# Patient Record
Sex: MALE | Race: ASIAN | Employment: OTHER | URBAN - METROPOLITAN AREA
[De-identification: names, ages, dates, MRNs, and addresses within clinical notes are randomized per-mention and may not be internally consistent; named-entity substitution may affect disease eponyms.]

---

## 2017-05-31 PROBLEM — Z87.891 HISTORY OF TOBACCO ABUSE: Status: ACTIVE | Noted: 2017-05-31

## 2017-06-06 ENCOUNTER — HOSPITAL ENCOUNTER (OUTPATIENT)
Dept: GENERAL RADIOLOGY | Age: 67
Discharge: HOME | End: 2017-06-06
Attending: FAMILY MEDICINE
Payer: MEDICARE

## 2017-06-06 DIAGNOSIS — M25.512 CHRONIC LEFT SHOULDER PAIN: ICD-10-CM

## 2017-06-06 DIAGNOSIS — R05.9 COUGH: ICD-10-CM

## 2017-06-06 DIAGNOSIS — G89.29 CHRONIC LEFT SHOULDER PAIN: ICD-10-CM

## 2017-06-06 PROCEDURE — 71020 XR CHEST PA LAT: CPT

## 2017-06-06 PROCEDURE — 73030 X-RAY EXAM OF SHOULDER: CPT

## 2017-06-11 PROBLEM — I51.7 MILD CARDIOMEGALY: Status: ACTIVE | Noted: 2017-06-11

## 2017-08-17 PROBLEM — Z12.11 SPECIAL SCREENING FOR MALIGNANT NEOPLASMS, COLON: Status: ACTIVE | Noted: 2017-08-17

## 2019-02-14 PROBLEM — N40.2 PROSTATE NODULE: Status: ACTIVE | Noted: 2019-02-14

## 2019-09-25 PROBLEM — Z12.11 SPECIAL SCREENING FOR MALIGNANT NEOPLASMS, COLON: Status: RESOLVED | Noted: 2017-08-17 | Resolved: 2019-09-25

## 2020-04-09 PROBLEM — K21.00 GERD WITH ESOPHAGITIS: Status: ACTIVE | Noted: 2020-04-09

## 2020-09-21 PROBLEM — R19.5 POSITIVE COLORECTAL CANCER SCREENING USING COLOGUARD TEST: Status: ACTIVE | Noted: 2020-09-21

## 2022-03-18 PROBLEM — K21.00 GERD WITH ESOPHAGITIS: Status: ACTIVE | Noted: 2020-04-09

## 2022-03-19 PROBLEM — N40.2 PROSTATE NODULE: Status: ACTIVE | Noted: 2019-02-14

## 2022-03-19 PROBLEM — I51.7 MILD CARDIOMEGALY: Status: ACTIVE | Noted: 2017-06-11

## 2022-03-19 PROBLEM — Z87.891 HISTORY OF TOBACCO ABUSE: Status: ACTIVE | Noted: 2017-05-31

## 2022-03-20 PROBLEM — R19.5 POSITIVE COLORECTAL CANCER SCREENING USING COLOGUARD TEST: Status: ACTIVE | Noted: 2020-09-21

## 2022-03-31 PROBLEM — R19.5 POSITIVE COLORECTAL CANCER SCREENING USING COLOGUARD TEST: Status: RESOLVED | Noted: 2020-09-21 | Resolved: 2022-03-31

## 2022-03-31 PROBLEM — D12.6 SERRATED ADENOMA OF COLON: Status: ACTIVE | Noted: 2022-03-31

## 2022-06-15 ENCOUNTER — PATIENT MESSAGE (OUTPATIENT)
Dept: FAMILY MEDICINE CLINIC | Facility: CLINIC | Age: 72
End: 2022-06-15

## 2022-06-16 RX ORDER — BLOOD-GLUCOSE METER
EACH MISCELLANEOUS
Qty: 1 KIT | Refills: 0 | Status: SHIPPED | OUTPATIENT
Start: 2022-06-16

## 2022-06-16 RX ORDER — BLOOD SUGAR DIAGNOSTIC
STRIP MISCELLANEOUS
Qty: 100 EACH | Refills: 1 | Status: SHIPPED | OUTPATIENT
Start: 2022-06-16

## 2022-06-16 RX ORDER — LANCETS
EACH MISCELLANEOUS
COMMUNITY
Start: 2018-03-28 | End: 2022-06-16 | Stop reason: SDUPTHER

## 2022-06-16 RX ORDER — LANCETS
EACH MISCELLANEOUS
Qty: 100 EACH | Refills: 1 | Status: SHIPPED | OUTPATIENT
Start: 2022-06-16

## 2022-06-16 RX ORDER — BLOOD-GLUCOSE METER
EACH MISCELLANEOUS
COMMUNITY
Start: 2018-03-28 | End: 2022-06-16 | Stop reason: SDUPTHER

## 2022-06-16 RX ORDER — BLOOD SUGAR DIAGNOSTIC
STRIP MISCELLANEOUS
COMMUNITY
Start: 2018-03-28 | End: 2022-06-16 | Stop reason: SDUPTHER

## 2022-06-16 NOTE — TELEPHONE ENCOUNTER
From: Surinder Chavira  To: Dr. Kary Sagastume: 6/15/2022  8:19 PM EDT  Subject: Meter    Could you send a prescription for new glucometer and test strips to [facility]? No brand preference--just whichever insurance will cover.

## 2022-08-31 RX ORDER — DULAGLUTIDE 3 MG/.5ML
INJECTION, SOLUTION SUBCUTANEOUS
Qty: 6 ML | Refills: 1 | OUTPATIENT
Start: 2022-08-31

## 2022-09-01 RX ORDER — DULAGLUTIDE 3 MG/.5ML
INJECTION, SOLUTION SUBCUTANEOUS
Qty: 6 ML | Refills: 1 | OUTPATIENT
Start: 2022-09-01

## 2022-09-13 ENCOUNTER — OFFICE VISIT (OUTPATIENT)
Dept: FAMILY MEDICINE CLINIC | Facility: CLINIC | Age: 72
End: 2022-09-13
Payer: COMMERCIAL

## 2022-09-13 VITALS
WEIGHT: 184.6 LBS | HEART RATE: 61 BPM | SYSTOLIC BLOOD PRESSURE: 142 MMHG | HEIGHT: 67 IN | BODY MASS INDEX: 28.97 KG/M2 | DIASTOLIC BLOOD PRESSURE: 78 MMHG | OXYGEN SATURATION: 97 %

## 2022-09-13 DIAGNOSIS — I10 ESSENTIAL HYPERTENSION: ICD-10-CM

## 2022-09-13 DIAGNOSIS — E11.9 TYPE 2 DIABETES MELLITUS WITHOUT COMPLICATION, WITHOUT LONG-TERM CURRENT USE OF INSULIN (HCC): ICD-10-CM

## 2022-09-13 DIAGNOSIS — N40.0 BENIGN PROSTATIC HYPERPLASIA WITHOUT LOWER URINARY TRACT SYMPTOMS: ICD-10-CM

## 2022-09-13 DIAGNOSIS — E11.9 TYPE 2 DIABETES MELLITUS WITHOUT COMPLICATION, WITHOUT LONG-TERM CURRENT USE OF INSULIN (HCC): Primary | ICD-10-CM

## 2022-09-13 PROCEDURE — 99214 OFFICE O/P EST MOD 30 MIN: CPT | Performed by: FAMILY MEDICINE

## 2022-09-13 PROCEDURE — 1123F ACP DISCUSS/DSCN MKR DOCD: CPT | Performed by: FAMILY MEDICINE

## 2022-09-13 PROCEDURE — 3051F HG A1C>EQUAL 7.0%<8.0%: CPT | Performed by: FAMILY MEDICINE

## 2022-09-13 NOTE — PROGRESS NOTES
never used smokeless tobacco.    Objective:  Blood pressure (!) 142/78, pulse 61, height 5' 7\" (1.702 m), weight 184 lb 9.6 oz (83.7 kg), SpO2 97 %. Body mass index is 28.91 kg/m². BP Readings from Last 3 Encounters:   09/13/22 (!) 142/78   03/31/22 130/74   03/22/22 128/78     General- Pleasant and no distress  Psych- alert and oriented to person, place and time  Mood and affect are appropriate to situation  Neurological- grossly intact. rrr s mrg.   bcta  I reviewed the patient's Diabetic Questionnaire and HTN Questionnaires above. Assessment:  1. Type 2 diabetes mellitus without complication, without long-term current use of insulin ([address]    2. Benign prostatic hyperplasia without lower urinary tract symptoms    3. Essential hypertension        Plan:   I would like to see you back for a complete diabetic examination, at which time we will have the convenience of having lab work back to discuss, and then we can review the results face to face instead of over the phone. We will then make any adjustments necessary to the medications and/or diet/treatment plan. 1. Type 2 diabetes mellitus without complication, without long-term current use of insulin ([address]  -     Basic Metabolic Panel; Future  -     Hemoglobin A1C; Future  2. Benign prostatic hyperplasia without lower urinary tract symptoms  -     PSA, Diagnostic; Future  3. Essential hypertension  -     Basic Metabolic Panel; Future      Followup:  Diabetic physician exam in a week or so, and we will review labs then and make any new treatment adjustments or recommendations at that time. Return for 7-10 days to discuss labs.

## 2022-09-14 ENCOUNTER — PATIENT MESSAGE (OUTPATIENT)
Dept: FAMILY MEDICINE CLINIC | Facility: CLINIC | Age: 72
End: 2022-09-14

## 2022-09-14 LAB
ANION GAP SERPL CALC-SCNC: 9 MMOL/L (ref 4–13)
BUN SERPL-MCNC: 15 MG/DL (ref 8–23)
CALCIUM SERPL-MCNC: 9.7 MG/DL (ref 8.3–10.4)
CHLORIDE SERPL-SCNC: 107 MMOL/L (ref 101–110)
CO2 SERPL-SCNC: 22 MMOL/L (ref 21–32)
CREAT SERPL-MCNC: 1.1 MG/DL (ref 0.8–1.5)
EST. AVERAGE GLUCOSE BLD GHB EST-MCNC: 160 MG/DL
GLUCOSE SERPL-MCNC: 173 MG/DL (ref 65–100)
HBA1C MFR BLD: 7.2 % (ref 4.8–5.6)
POTASSIUM SERPL-SCNC: 4.2 MMOL/L (ref 3.5–5.1)
PSA SERPL-MCNC: 1.1 NG/ML
SODIUM SERPL-SCNC: 138 MMOL/L (ref 138–145)

## 2022-09-14 RX ORDER — DULAGLUTIDE 3 MG/.5ML
3 INJECTION, SOLUTION SUBCUTANEOUS
Qty: 4 ADJUSTABLE DOSE PRE-FILLED PEN SYRINGE | Refills: 0 | Status: SHIPPED | OUTPATIENT
Start: 2022-09-14 | End: 2022-09-20 | Stop reason: SDUPTHER

## 2022-09-14 NOTE — TELEPHONE ENCOUNTER
From: Dung Villalobos  To: Dr. Francisco Cox  Sent: 9/14/2022 2:43 PM EDT  Subject: Refill    Hi,    Could you send a refill of Trulicity to [facility]? Won't have enough until appt next week.     Thanks,  Phuc Connor

## 2022-09-20 ENCOUNTER — OFFICE VISIT (OUTPATIENT)
Dept: FAMILY MEDICINE CLINIC | Facility: CLINIC | Age: 72
End: 2022-09-20
Payer: COMMERCIAL

## 2022-09-20 VITALS
SYSTOLIC BLOOD PRESSURE: 120 MMHG | DIASTOLIC BLOOD PRESSURE: 70 MMHG | BODY MASS INDEX: 28.72 KG/M2 | WEIGHT: 183 LBS | HEIGHT: 67 IN

## 2022-09-20 DIAGNOSIS — I10 ESSENTIAL HYPERTENSION: ICD-10-CM

## 2022-09-20 DIAGNOSIS — N40.2 PROSTATE NODULE: ICD-10-CM

## 2022-09-20 DIAGNOSIS — E11.9 TYPE 2 DIABETES MELLITUS WITHOUT COMPLICATION, WITHOUT LONG-TERM CURRENT USE OF INSULIN (HCC): Primary | ICD-10-CM

## 2022-09-20 DIAGNOSIS — E78.5 DYSLIPIDEMIA: ICD-10-CM

## 2022-09-20 DIAGNOSIS — N40.0 BENIGN PROSTATIC HYPERPLASIA WITHOUT LOWER URINARY TRACT SYMPTOMS: ICD-10-CM

## 2022-09-20 PROCEDURE — 3051F HG A1C>EQUAL 7.0%<8.0%: CPT | Performed by: FAMILY MEDICINE

## 2022-09-20 PROCEDURE — 99214 OFFICE O/P EST MOD 30 MIN: CPT | Performed by: FAMILY MEDICINE

## 2022-09-20 PROCEDURE — 1123F ACP DISCUSS/DSCN MKR DOCD: CPT | Performed by: FAMILY MEDICINE

## 2022-09-20 RX ORDER — VALSARTAN 320 MG/1
320 TABLET ORAL DAILY
Qty: 90 TABLET | Refills: 1 | Status: SHIPPED | OUTPATIENT
Start: 2022-09-20

## 2022-09-20 RX ORDER — PRAVASTATIN SODIUM 80 MG/1
80 TABLET ORAL DAILY
Qty: 90 TABLET | Refills: 1 | Status: SHIPPED | OUTPATIENT
Start: 2022-09-20

## 2022-09-20 RX ORDER — DULAGLUTIDE 3 MG/.5ML
3 INJECTION, SOLUTION SUBCUTANEOUS
Qty: 12 ADJUSTABLE DOSE PRE-FILLED PEN SYRINGE | Refills: 1 | Status: SHIPPED | OUTPATIENT
Start: 2022-09-20

## 2022-09-20 RX ORDER — NATEGLINIDE 60 MG/1
60 TABLET ORAL
Qty: 270 TABLET | Refills: 1 | Status: SHIPPED | OUTPATIENT
Start: 2022-09-20

## 2022-09-20 ASSESSMENT — PATIENT HEALTH QUESTIONNAIRE - PHQ9
1. LITTLE INTEREST OR PLEASURE IN DOING THINGS: 0
SUM OF ALL RESPONSES TO PHQ QUESTIONS 1-9: 0
SUM OF ALL RESPONSES TO PHQ9 QUESTIONS 1 & 2: 0
SUM OF ALL RESPONSES TO PHQ QUESTIONS 1-9: 0
2. FEELING DOWN, DEPRESSED OR HOPELESS: 0
SUM OF ALL RESPONSES TO PHQ QUESTIONS 1-9: 0
SUM OF ALL RESPONSES TO PHQ QUESTIONS 1-9: 0

## 2022-09-20 NOTE — PROGRESS NOTES
Subjective:  Juaquin Garcia is a 67 y.o. male presents today for their semi-annual diabetic visit. They are also due for their semiannual hypertension visit and They are also due for their annual prostate check and f/u of previously detected nodule. Systems review of cardiovascular and pulmonary systems reveal no complaints or pertinent postivies. Allergies   Allergen Reactions    Aspirin    PHQ-9 Total Score: 0 (9/20/2022  8:05 AM)     reports that he quit smoking about 42 years ago. He smoked an average of 1 pack per day. He has never used smokeless tobacco.    Lab Results   Component Value Date/Time     09/13/2022 08:28 AM    K 4.2 09/13/2022 08:28 AM     09/13/2022 08:28 AM    CO2 22 09/13/2022 08:28 AM    BUN 15 09/13/2022 08:28 AM    CREATININE 1.10 09/13/2022 08:28 AM    GLUCOSE 173 09/13/2022 08:28 AM    CALCIUM 9.7 09/13/2022 08:28 AM      Lab Results   Component Value Date    LABA1C 7.2 (H) 09/13/2022     Lab Results   Component Value Date     09/13/2022       BP Readings from Last 3 Encounters:   09/20/22 120/70   09/13/22 (!) 142/78   03/31/22 130/74       Objective:  Blood pressure 120/70, height 5' 7\" (1.702 m), weight 183 lb (83 kg). Body mass index is 28.66 kg/m². General- pleasant, no distress  Psych- alert and oriented to person, place and time  Mood and affect are appropriate to the visit  rrr s mrg.   bcta  Skin with shoes and socks off, inspection of feet under good care, no breakdown evident.   Diabetic foot exam:   Left Foot:   Visual Exam: normal   Pulse DP: 2+ (normal)   Filament test: normal sensation   Vibratory Sensation: normal  Right Foot:   Visual Exam: normal   Pulse DP: 2+ (normal)   Filament test: normal sensation   Vibratory Sensation: normal       There are no carotid bruits  Diabetic eye exam was performed today at the visit  yes  Fundoscopic exam is: benign without retinopathology   Prostate of increased size and with a nodule left lobe, easily palpable. He has good rectal tone with light brown stool. Some of today's visit is spent counseling with review of symptoms, disposition, prognosis and treatment plan options in addition to limited behavioral counseling and recommendations regarding an enlarged prostate and possible symptoms of low testosterone    AUA symptom index if administered is on flow sheet  Return in one year for annual prostate exam. We will call you back if labs are abnormal.      We spent some time reviewing recent labs, there implications, and our plan for current treatment and long term goals. Assessment:  1. Type 2 diabetes mellitus without complication, without long-term current use of insulin ([address]    2. Benign prostatic hyperplasia without lower urinary tract symptoms    3. Prostate nodule    4. Essential hypertension    5. Dyslipidemia        Plan:   Prescription drug management occurs today as follows: No adjustment in diabetic medications needed; keep up the good work  Personal instruction is given. For your information, consider the following: significant weight loss can result in a drop of 5-10mm of blood pressure! The \"DASH\" diet (see bookstore or go to www.MSDSonline.com and print the DASH diet) will lower 8-14mm of pressure. The ADA recommends a target bp of <140/90 for most patients with diabetes but for high risk patients such as those with heart disease, a history of stents, angioplasty, heart attacks, strokes, diabetes and chronic kidney disease, your goal is 130/80. Jessica Mace has been given the following recommendations today due to his elevated BP reading: lifestyle modifications to include: DASH eating plan.   Last year he was not interested in referral to urology, but in conversation with him today about the risk benefit and talking about his age and the fact that it is a normal PSA is encouraging but still might be wise to have a consultation and possible ultrasound, he would like to proceed with that referral this year    1. Type 2 diabetes mellitus without complication, without long-term current use of insulin (HCC)  -     Dulaglutide (TRULICITY) 3 DA/8.4LP SOPN; Inject 3 mg into the skin every 7 days, Disp-12 Adjustable Dose Pre-filled Pen Syringe, R-1Normal  -     metFORMIN (GLUCOPHAGE) 1000 MG tablet; Take 1 tablet by mouth 2 times daily, Disp-180 tablet, R-1Normal  -     nateglinide (STARLIX) 60 MG tablet; Take 1 tablet by mouth 3 times daily (before meals), Disp-270 tablet, R-1Normal  2. Benign prostatic hyperplasia without lower urinary tract symptoms  3. Prostate nodule  -     Ambulatory referral to Urology  4. Essential hypertension  -     valsartan (DIOVAN) 320 MG tablet; Take 1 tablet by mouth daily, Disp-90 tablet, R-1Normal  5. Dyslipidemia  -     pravastatin (PRAVACHOL) 80 MG tablet; Take 1 tablet by mouth daily, Disp-90 tablet, R-1Normal      Followup:  Return for 6 mo for part 1 and 2. November for jordan.

## 2022-09-21 DIAGNOSIS — M15.9 GENERALIZED OSTEOARTHRITIS OF HAND: Primary | ICD-10-CM

## 2022-10-10 ENCOUNTER — TELEPHONE (OUTPATIENT)
Dept: FAMILY MEDICINE CLINIC | Facility: CLINIC | Age: 72
End: 2022-10-10

## 2022-10-10 RX ORDER — NAPROXEN 375 MG/1
375 TABLET ORAL 2 TIMES DAILY WITH MEALS
Qty: 45 TABLET | Refills: 1 | Status: SHIPPED | OUTPATIENT
Start: 2022-10-10

## 2022-10-10 NOTE — TELEPHONE ENCOUNTER
----- Message from Conchis Baker MD sent at 10/10/2022  8:23 AM EDT -----  Regarding: RE: Hand Pain  I understand. Voltaren gel is not effective for everybody. It is the next best thing to oral medications which we typically avoid. I would be willing to try him on generic naprosyn, 375mg one twice a day to see if that will work. Then we can discuss renewing or trying a long-acting form during his Medicare well visit in November. Please pend #45 and 1 refill  ----- Message -----  From: Zhanna Greene MA  Sent: 10/10/2022   8:09 AM EDT  To: Conchis Baker MD  Subject: FW: Hand Pain                                      ----- Message -----  From: Meggan Cordero  Sent: 10/7/2022   2:29 PM EDT  To: [facility] Clinical Staff  Subject: Hand Pain                                        Hi,    My grandfather has been using his diclofenac gel for a couple weeks now, but he said it wears off too quickly. He applies it 4 times daily but the pain relief doesn't seem to last.   Should he apply the gel more often or should he be switched to something else?     Thanks,  Matilda Stoll

## 2022-11-08 SDOH — HEALTH STABILITY: PHYSICAL HEALTH: ON AVERAGE, HOW MANY DAYS PER WEEK DO YOU ENGAGE IN MODERATE TO STRENUOUS EXERCISE (LIKE A BRISK WALK)?: 4 DAYS

## 2022-11-08 SDOH — HEALTH STABILITY: PHYSICAL HEALTH: ON AVERAGE, HOW MANY MINUTES DO YOU ENGAGE IN EXERCISE AT THIS LEVEL?: 20 MIN

## 2022-11-08 ASSESSMENT — LIFESTYLE VARIABLES
HOW MANY STANDARD DRINKS CONTAINING ALCOHOL DO YOU HAVE ON A TYPICAL DAY: 1 OR 2
HOW OFTEN DO YOU HAVE A DRINK CONTAINING ALCOHOL: 4
HOW MANY STANDARD DRINKS CONTAINING ALCOHOL DO YOU HAVE ON A TYPICAL DAY: 1
HOW OFTEN DO YOU HAVE SIX OR MORE DRINKS ON ONE OCCASION: 1
HOW OFTEN DO YOU HAVE A DRINK CONTAINING ALCOHOL: 2-3 TIMES A WEEK

## 2022-11-08 ASSESSMENT — PATIENT HEALTH QUESTIONNAIRE - PHQ9
SUM OF ALL RESPONSES TO PHQ9 QUESTIONS 1 & 2: 0
SUM OF ALL RESPONSES TO PHQ QUESTIONS 1-9: 0
SUM OF ALL RESPONSES TO PHQ QUESTIONS 1-9: 0
2. FEELING DOWN, DEPRESSED OR HOPELESS: 0
1. LITTLE INTEREST OR PLEASURE IN DOING THINGS: 0
SUM OF ALL RESPONSES TO PHQ QUESTIONS 1-9: 0
SUM OF ALL RESPONSES TO PHQ QUESTIONS 1-9: 0

## 2022-11-15 ENCOUNTER — OFFICE VISIT (OUTPATIENT)
Dept: UROLOGY | Age: 72
End: 2022-11-15
Payer: COMMERCIAL

## 2022-11-15 DIAGNOSIS — N40.2 PROSTATE NODULE: Primary | ICD-10-CM

## 2022-11-15 DIAGNOSIS — N40.0 BENIGN PROSTATIC HYPERPLASIA WITHOUT LOWER URINARY TRACT SYMPTOMS: ICD-10-CM

## 2022-11-15 PROCEDURE — 99204 OFFICE O/P NEW MOD 45 MIN: CPT | Performed by: NURSE PRACTITIONER

## 2022-11-15 PROCEDURE — 1123F ACP DISCUSS/DSCN MKR DOCD: CPT | Performed by: NURSE PRACTITIONER

## 2022-11-15 RX ORDER — TAMSULOSIN HYDROCHLORIDE 0.4 MG/1
0.4 CAPSULE ORAL EVERY EVENING
Qty: 30 CAPSULE | Refills: 11 | Status: SHIPPED | OUTPATIENT
Start: 2022-11-15

## 2022-11-15 ASSESSMENT — ENCOUNTER SYMPTOMS
CONSTIPATION: 1
VOMITING: 0
BACK PAIN: 0
EYE PAIN: 0
HEARTBURN: 1
SKIN LESIONS: 0
EYE DISCHARGE: 0
NAUSEA: 0
INDIGESTION: 1
BLOOD IN STOOL: 0
COUGH: 1
DIARRHEA: 0
WHEEZING: 0
SHORTNESS OF BREATH: 0
ABDOMINAL PAIN: 0

## 2022-11-15 NOTE — PROGRESS NOTES
[address], [address]  [phone]          Elizabeth Kern  : 1950    Chief Complaint   Patient presents with    New Patient     Prostate nodule          HPI     Elizabeth Kern is a 67 y.o. male    Here today referred by primary care physician due to ongoing prostate issues and prostate nodule. Patient is accompanied by his daughter who helps interpret. She tells me that the patient voids a 2-3 times a night. His urine flow and stream have been weak. He has not tried any prostate medication. For several years he has been told that there is a prostate nodule. Due to the ongoing issue PCP referred here for evaluation. PSA blood work has been normal.    PSA 2020 was 0.8  PSA  was 0.9  PSA  is 1.1. There is no family history of prostate cancer or prostate issues. Past Medical History:   Diagnosis Date    Diabetes mellitus ([facility]     High cholesterol     History of shingles aug 2015    Hypertension      No past surgical history on file. Current Outpatient Medications   Medication Sig Dispense Refill    tamsulosin (FLOMAX) 0.4 MG capsule Take 1 capsule by mouth every evening 30 capsule 11    naproxen (NAPROSYN) 375 MG tablet Take 1 tablet by mouth 2 times daily (with meals) 45 tablet 1    Dulaglutide (TRULICITY) 3 TH/3.2SW SOPN Inject 3 mg into the skin every 7 days 12 Adjustable Dose Pre-filled Pen Syringe 1    metFORMIN (GLUCOPHAGE) 1000 MG tablet Take 1 tablet by mouth 2 times daily 180 tablet 1    nateglinide (STARLIX) 60 MG tablet Take 1 tablet by mouth 3 times daily (before meals) 270 tablet 1    pravastatin (PRAVACHOL) 80 MG tablet Take 1 tablet by mouth daily 90 tablet 1    valsartan (DIOVAN) 320 MG tablet Take 1 tablet by mouth daily 90 tablet 1    Accu-Chek FastClix Lancets MISC Check glucose once daily. DX: E11.65 100 each 1    Blood Glucose Monitoring Suppl (ACCU-CHEK GUIDE) w/Device KIT Check glucose once daily.  DX: E11.65 1 kit 0    blood glucose test strips (ACCU-CHEK GUIDE) strip Check glucose once daily. DX: E11.65 100 each 1    diclofenac sodium (VOLTAREN) 1 % GEL Apply 2 g topically 4 times daily 150 g 5     No current facility-administered medications for this visit. Allergies   Allergen Reactions    Aspirin      Social History     Socioeconomic History    Marital status:      Spouse name: Not on file    Number of children: Not on file    Years of education: Not on file    Highest education level: Not on file   Occupational History    Not on file   Tobacco Use    Smoking status: Former     Packs/day: 1.00     Types: Cigarettes     Quit date: 1980     Years since quittin.9    Smokeless tobacco: Never   Substance and Sexual Activity    Alcohol use: Yes     Alcohol/week: 0.0 standard drinks    Drug use: Not on file    Sexual activity: Not on file   Other Topics Concern    Not on file   Social History Narrative    Not on file     Social Determinants of Health     Financial Resource Strain: Not on file   Food Insecurity: Not on file   Transportation Needs: Not on file   Physical Activity: Insufficiently Active    Days of Exercise per Week: 4 days    Minutes of Exercise per Session: 20 min   Stress: Not on file   Social Connections: Not on file   Intimate Partner Violence: Not on file   Housing Stability: Not on file     No family history on file. Review of Systems  Constitutional:   Negative for fever, chills, appetite change, malaise/fatigue, headaches and weight loss. Skin:  Negative for skin lesions, rash and itching. Eyes:  Negative for visual disturbance, eye pain and eye discharge. ENT: Positive for high frequency hearing loss. Negative for difficulty articulating words, pain swallowing and dry mouth. Respiratory: Positive for cough. Negative for blood in sputum, shortness of breath and wheezing. Cardiovascular: Positive for chest pain and hypertension.  Negative for irregular heartbeat, leg pain, leg swelling, regular rate and rhythm and varicose veins. GI: Positive for constipation, indigestion and heartburn. Negative for nausea, vomiting, abdominal pain, blood in stool and diarrhea. Genitourinary: Positive for nocturia, urgency, frequent urination and incomplete emptying. Negative for urinary burning, hematuria, flank pain, recurrent UTIs, history of urolithiasis, slower stream, straining, leakage w/ urge, erectile dysfunction, testicular pain, sexually transmitted disease, discharge and urethral stricture. Musculoskeletal: Positive for arthralgias. Negative for back pain, bone pain, tenderness, muscle weakness and neck pain. Neurological:  Negative for dizziness, focal weakness, numbness, seizures and tremors. Psychological:  Negative for depression and psychiatric problem. Endocrine: Positive for excessive urination. Negative for cold intolerance, thirst, fatigue and heat intolerance. Hem/Lymphatic:  Negative for easy bleeding, easy bruising and frequent infections. PHYSICAL EXAM    There were no vitals taken for this visit. General appearance - alert, well appearing, and in no distress  Mental status - alert, oriented to person, place, and time  Chest - clear to auscultation, no wheezes. Heart - normal rate, regular rhythm. Abdomen - soft, nontender, nondistended, no masses or organomegaly   -  1+ enlarged prostate there is nodule felt on the left side. No nontender  Rectal - normal rectal tone, no mass. Musculoskeletal - no joint tenderness, deformity or swelling  kin - normal coloration and turgor, no rashes      Assessment and Plan    ICD-10-CM    1. Prostate nodule  N40.2       2. Benign prostatic hyperplasia without lower urinary tract symptoms  N40.0         PLAN:  We will add Flomax po Q HS   Return next month to see MD.  We will assess urinary symtpoms and have MD check nodule to see if further testing with Bx or MRI is needed.      TANNA Rocha - MARKY Sorto is supervising physician today and he approves plan of care. Return in about 4 weeks (around 12/13/2022) for follow up with Christopher Singh. Elements of this note have been dictated using speech recognition software. Although reviewed, errors of speech recognition may have occurred.

## 2022-11-17 RX ORDER — NAPROXEN 375 MG/1
TABLET ORAL
Qty: 45 TABLET | Refills: 1 | OUTPATIENT
Start: 2022-11-17

## 2022-11-18 RX ORDER — NAPROXEN 375 MG/1
TABLET ORAL
Qty: 45 TABLET | Refills: 1 | OUTPATIENT
Start: 2022-11-18

## 2022-11-21 DIAGNOSIS — E11.9 TYPE 2 DIABETES MELLITUS WITHOUT COMPLICATION, WITHOUT LONG-TERM CURRENT USE OF INSULIN (HCC): ICD-10-CM

## 2022-11-21 RX ORDER — DULAGLUTIDE 3 MG/.5ML
INJECTION, SOLUTION SUBCUTANEOUS
Qty: 4 ML | Refills: 0 | OUTPATIENT
Start: 2022-11-21

## 2022-11-22 ENCOUNTER — OFFICE VISIT (OUTPATIENT)
Dept: FAMILY MEDICINE CLINIC | Facility: CLINIC | Age: 72
End: 2022-11-22
Payer: COMMERCIAL

## 2022-11-22 VITALS
HEIGHT: 67 IN | TEMPERATURE: 98 F | DIASTOLIC BLOOD PRESSURE: 72 MMHG | HEART RATE: 77 BPM | BODY MASS INDEX: 29.35 KG/M2 | WEIGHT: 187 LBS | OXYGEN SATURATION: 98 % | SYSTOLIC BLOOD PRESSURE: 128 MMHG

## 2022-11-22 DIAGNOSIS — E11.9 TYPE 2 DIABETES MELLITUS WITHOUT COMPLICATION, WITHOUT LONG-TERM CURRENT USE OF INSULIN (HCC): ICD-10-CM

## 2022-11-22 DIAGNOSIS — Z00.00 MEDICARE ANNUAL WELLNESS VISIT, SUBSEQUENT: Chronic | ICD-10-CM

## 2022-11-22 DIAGNOSIS — Z23 FLU VACCINE NEED: Primary | ICD-10-CM

## 2022-11-22 PROCEDURE — 1123F ACP DISCUSS/DSCN MKR DOCD: CPT | Performed by: FAMILY MEDICINE

## 2022-11-22 PROCEDURE — G0008 ADMIN INFLUENZA VIRUS VAC: HCPCS | Performed by: FAMILY MEDICINE

## 2022-11-22 PROCEDURE — 90694 VACC AIIV4 NO PRSRV 0.5ML IM: CPT | Performed by: FAMILY MEDICINE

## 2022-11-22 PROCEDURE — 3078F DIAST BP <80 MM HG: CPT | Performed by: FAMILY MEDICINE

## 2022-11-22 PROCEDURE — 3074F SYST BP LT 130 MM HG: CPT | Performed by: FAMILY MEDICINE

## 2022-11-22 PROCEDURE — 3051F HG A1C>EQUAL 7.0%<8.0%: CPT | Performed by: FAMILY MEDICINE

## 2022-11-22 PROCEDURE — G0439 PPPS, SUBSEQ VISIT: HCPCS | Performed by: FAMILY MEDICINE

## 2022-11-22 RX ORDER — DULAGLUTIDE 3 MG/.5ML
3 INJECTION, SOLUTION SUBCUTANEOUS
Qty: 12 ADJUSTABLE DOSE PRE-FILLED PEN SYRINGE | Refills: 3 | Status: SHIPPED | OUTPATIENT
Start: 2022-11-22

## 2022-11-22 ASSESSMENT — LIFESTYLE VARIABLES
HOW MANY STANDARD DRINKS CONTAINING ALCOHOL DO YOU HAVE ON A TYPICAL DAY: 1 OR 2
HOW OFTEN DO YOU HAVE A DRINK CONTAINING ALCOHOL: 2-4 TIMES A MONTH

## 2022-11-22 ASSESSMENT — PATIENT HEALTH QUESTIONNAIRE - PHQ9
2. FEELING DOWN, DEPRESSED OR HOPELESS: 0
1. LITTLE INTEREST OR PLEASURE IN DOING THINGS: 0
SUM OF ALL RESPONSES TO PHQ QUESTIONS 1-9: 0
SUM OF ALL RESPONSES TO PHQ9 QUESTIONS 1 & 2: 0
SUM OF ALL RESPONSES TO PHQ QUESTIONS 1-9: 0

## 2022-11-22 NOTE — PROGRESS NOTES
your hearing that hasn't been managed with hearing aids?: (!) Yes  Do you have difficulty driving, watching TV, or doing any of your daily activities because of your eyesight?: No  Have you had an eye exam within the past year?: Yes  No results found. Hearing/Vision Interventions: Will discuss hearing     ADLs:  In the past 7 days, did you need help from others to perform any of the following everyday activities: Eating, dressing, grooming, bathing, toileting, or walking/balance?: No  In the past 7 days, did you need help from others to take care of any of the following: Laundry, housekeeping, banking/finances, shopping, telephone use, food preparation, transportation, or taking medications?: (!) Yes  Select all that apply: Aaliyah Pretzel, Banking/Finances, Transportation  ADL Interventions:  Has family          Objective   Vitals:    11/22/22 0836   BP: 128/72   Pulse: 77   Temp: 98 °F (36.7 °C)   SpO2: 98%   Weight: 187 lb (84.8 kg)   Height: 5' 7\" (1.702 m)      Body mass index is 29.29 kg/m². Allergies   Allergen Reactions    Aspirin      Prior to Visit Medications    Medication Sig Taking? Authorizing Provider   Dulaglutide (TRULICITY) 3 LA/9.4NZ SOPN Inject 3 mg into the skin every 7 days Yes Ramirez Knox MD   tamsulosin (FLOMAX) 0.4 MG capsule Take 1 capsule by mouth every evening Yes TANNA Prince - NP   naproxen (NAPROSYN) 375 MG tablet Take 1 tablet by mouth 2 times daily (with meals) Yes Ramirez Knox MD   metFORMIN (GLUCOPHAGE) 1000 MG tablet Take 1 tablet by mouth 2 times daily Yes Ramirez Knox MD   nateglinide (STARLIX) 60 MG tablet Take 1 tablet by mouth 3 times daily (before meals) Yes Ramirez Knox MD   pravastatin (PRAVACHOL) 80 MG tablet Take 1 tablet by mouth daily Yes Ramirez Knox MD   valsartan (DIOVAN) 320 MG tablet Take 1 tablet by mouth daily Yes Ramirez Knox MD   Accu-Chek FastClix Lancets MISC Check glucose once daily.  DX: P32.12 Yes Ramirez Knox MD   Blood Glucose Monitoring Suppl (ACCU-CHEK GUIDE) w/Device KIT Check glucose once daily. DX: E11.65 Yes Jayden Monreal MD   blood glucose test strips (ACCU-CHEK GUIDE) strip Check glucose once daily.  DX: E11.65 Yes Jayden Monreal MD   diclofenac sodium (VOLTAREN) 1 % GEL Apply 2 g topically 4 times daily  Jayden Monreal MD       [facility] (Including outside providers/suppliers regularly involved in providing care):   Patient Care Team:  Jayden Monreal MD as PCP - Yimi Anton MD as PCP - [facility] Empaneled Provider     Reviewed and updated this visit:  Tobacco  Allergies  Meds  Med Hx  Surg Hx  Soc Hx  Fam Hx

## 2022-11-22 NOTE — PATIENT INSTRUCTIONS
Personalized Preventive Plan for Farooq Posadas - 11/22/2022  Medicare offers a range of preventive health benefits. Some of the tests and screenings are paid in full while other may be subject to a deductible, co-insurance, and/or copay. Some of these benefits include a comprehensive review of your medical history including lifestyle, illnesses that may run in your family, and various assessments and screenings as appropriate. After reviewing your medical record and screening and assessments performed today your provider may have ordered immunizations, labs, imaging, and/or referrals for you. A list of these orders (if applicable) as well as your Preventive Care list are included within your After Visit Summary for your review. Other Preventive Recommendations:    A preventive eye exam performed by an eye specialist is recommended every 1-2 years to screen for glaucoma; cataracts, macular degeneration, and other eye disorders. A preventive dental visit is recommended every 6 months. Try to get at least 150 minutes of exercise per week or 10,000 steps per day on a pedometer . Order or download the FREE \"Exercise & Physical Activity: Your Everyday Guide\" from [employer]. Call 7-202.417.6139 or search [employer] online. You need 7396-6937 mg of calcium and 2887-6642 IU of vitamin D per day. It is possible to meet your calcium requirement with diet alone, but a vitamin D supplement is usually necessary to meet this goal.  When exposed to the sun, use a sunscreen that protects against both UVA and UVB radiation with an SPF of 30 or greater. Reapply every 2 to 3 hours or after sweating, drying off with a towel, or swimming. Always wear a seat belt when traveling in a car. Always wear a helmet when riding a bicycle or motorcycle.

## 2022-11-30 RX ORDER — NAPROXEN 375 MG/1
375 TABLET ORAL 2 TIMES DAILY WITH MEALS
Qty: 45 TABLET | Refills: 1 | OUTPATIENT
Start: 2022-11-30

## 2022-11-30 RX ORDER — NAPROXEN 375 MG/1
TABLET ORAL
Qty: 45 TABLET | Refills: 1 | OUTPATIENT
Start: 2022-11-30

## 2022-12-10 ENCOUNTER — PATIENT MESSAGE (OUTPATIENT)
Dept: FAMILY MEDICINE CLINIC | Facility: CLINIC | Age: 72
End: 2022-12-10

## 2022-12-12 RX ORDER — NAPROXEN 375 MG/1
375 TABLET ORAL 2 TIMES DAILY WITH MEALS
Qty: 45 TABLET | Refills: 1 | Status: SHIPPED | OUTPATIENT
Start: 2022-12-12

## 2022-12-12 NOTE — TELEPHONE ENCOUNTER
Per Dr. Andrade Soto     Regarding his blood pressure. It Is really important to make sure that these symptoms he is describing are not because of low blood sugar when they happen, but if his sugars are fine then he can begin taking one half of his bp medicine each day but monitor his blood pressures and symptoms during that time. If that works then that it is fine.

## 2022-12-12 NOTE — TELEPHONE ENCOUNTER
From: Brock Turner  To: Dr. Isabell Alfaro: 12/10/2022 2:44 PM EST  Subject: Cosimo Cedar when standing    My grandfather recently has been getting woozy when he stands and his vision goes dark. His blood sugar is within his usually range. BP sitting 111/66  BP standing 110/64   Please advise. On a separate note, could we get a refill of his naproxen > [facility].      Jesus Bonilla / Brock Turner

## 2022-12-15 DIAGNOSIS — R42 DIZZINESS: Primary | ICD-10-CM

## 2022-12-22 ENCOUNTER — TELEPHONE (OUTPATIENT)
Dept: FAMILY MEDICINE CLINIC | Facility: CLINIC | Age: 72
End: 2022-12-22

## 2022-12-22 ENCOUNTER — PATIENT MESSAGE (OUTPATIENT)
Dept: FAMILY MEDICINE CLINIC | Facility: CLINIC | Age: 72
End: 2022-12-22

## 2022-12-22 DIAGNOSIS — R05.1 ACUTE COUGH: Primary | ICD-10-CM

## 2022-12-22 DIAGNOSIS — U07.1 COVID-19: ICD-10-CM

## 2022-12-22 RX ORDER — BENZONATATE 100 MG/1
100 CAPSULE ORAL 3 TIMES DAILY PRN
Qty: 45 CAPSULE | Refills: 2 | Status: SHIPPED | OUTPATIENT
Start: 2022-12-22 | End: 2022-12-29

## 2022-12-22 NOTE — TELEPHONE ENCOUNTER
From: Farooq Posadas  To: Dr. Hicks Salomón: 12/22/2022 2:27 AM EST  Subject: COVID Cough     Grandfather tested positive for COVID 12/21. Symptoms started 12/19. Fever, cough, congestion, body aches, and chills. He's doing ok, but his cough is pretty bad. ...dry hacking cough. He's taking an otc cough suppressant but it doesn't seem to be helping. Could you send in something like Tessalon Perles to [facility]?     Shyann Multani / Farooq Posadas

## 2022-12-22 NOTE — TELEPHONE ENCOUNTER
Called patient and let the Granddaughter know that Paxlovid and Perles were called for patient.    TL

## 2022-12-22 NOTE — TELEPHONE ENCOUNTER
KATHERIN aguilar is calling to request  a new prescription PAXLOVId  to be sent to  [place] [id number] Marcelo Holter

## 2023-01-18 ENCOUNTER — OFFICE VISIT (OUTPATIENT)
Dept: UROLOGY | Age: 73
End: 2023-01-18
Payer: COMMERCIAL

## 2023-01-18 DIAGNOSIS — N40.2 PROSTATE NODULE: Primary | ICD-10-CM

## 2023-01-18 DIAGNOSIS — N40.0 BENIGN PROSTATIC HYPERPLASIA WITHOUT LOWER URINARY TRACT SYMPTOMS: ICD-10-CM

## 2023-01-18 PROCEDURE — 99213 OFFICE O/P EST LOW 20 MIN: CPT | Performed by: UROLOGY

## 2023-01-18 PROCEDURE — 1123F ACP DISCUSS/DSCN MKR DOCD: CPT | Performed by: UROLOGY

## 2023-01-18 ASSESSMENT — ENCOUNTER SYMPTOMS: NAUSEA: 0

## 2023-01-23 ENCOUNTER — TELEPHONE (OUTPATIENT)
Dept: FAMILY MEDICINE CLINIC | Facility: CLINIC | Age: 73
End: 2023-01-23

## 2023-01-23 RX ORDER — LANCETS
1 EACH MISCELLANEOUS 4 TIMES DAILY
Qty: 100 EACH | Refills: 3 | Status: SHIPPED | OUTPATIENT
Start: 2023-01-23

## 2023-01-23 NOTE — TELEPHONE ENCOUNTER
----- Message from Last Quintero sent at 1/20/2023  3:28 PM EST -----  Regarding: Rx Request  Could we have a prescription for Accu-chek Softclix lancets sent to [facility]? The fastclix don't work with my grandfather's lancing pen. Thanks!   [address]

## 2023-01-27 ENCOUNTER — TELEPHONE (OUTPATIENT)
Dept: UROLOGY | Age: 73
End: 2023-01-27

## 2023-01-27 DIAGNOSIS — S05.90XA EYE INJURY, UNSPECIFIED LATERALITY, INITIAL ENCOUNTER: Primary | ICD-10-CM

## 2023-01-27 NOTE — TELEPHONE ENCOUNTER
Radiology scheduling is calling asking for Dr Darren Sanchez to place an orders for a plain x ray of the eye orbits. The patient is schedule for an mri and they need to look for metal in his eyes due to patient history.

## 2023-02-15 ENCOUNTER — HOSPITAL ENCOUNTER (OUTPATIENT)
Dept: GENERAL RADIOLOGY | Age: 73
Discharge: HOME | End: 2023-02-18
Payer: COMMERCIAL

## 2023-02-15 ENCOUNTER — HOSPITAL ENCOUNTER (OUTPATIENT)
Dept: MRI IMAGING | Age: 73
Discharge: HOME | End: 2023-02-18
Payer: COMMERCIAL

## 2023-02-15 DIAGNOSIS — N40.2 PROSTATE NODULE: ICD-10-CM

## 2023-02-15 DIAGNOSIS — S05.90XA EYE INJURY, UNSPECIFIED LATERALITY, INITIAL ENCOUNTER: ICD-10-CM

## 2023-02-15 PROCEDURE — 72197 MRI PELVIS W/O & W/DYE: CPT

## 2023-02-15 PROCEDURE — 2580000003 HC RX 258: Performed by: UROLOGY

## 2023-02-15 PROCEDURE — 6360000004 HC RX CONTRAST MEDICATION: Performed by: UROLOGY

## 2023-02-15 PROCEDURE — 70030 X-RAY EYE FOR FOREIGN BODY: CPT

## 2023-02-15 PROCEDURE — A9579 GAD-BASE MR CONTRAST NOS,1ML: HCPCS | Performed by: UROLOGY

## 2023-02-15 RX ORDER — SODIUM CHLORIDE 0.9 % (FLUSH) 0.9 %
20 SYRINGE (ML) INJECTION AS NEEDED
Status: DISCONTINUED | OUTPATIENT
Start: 2023-02-15 | End: 2023-02-19 | Stop reason: HOSPADM

## 2023-02-15 RX ADMIN — SODIUM CHLORIDE, PRESERVATIVE FREE 20 ML: 5 INJECTION INTRAVENOUS at 12:53

## 2023-02-15 RX ADMIN — GADOTERIDOL 17 ML: 279.3 INJECTION, SOLUTION INTRAVENOUS at 12:53

## 2023-02-21 ENCOUNTER — TELEPHONE (OUTPATIENT)
Dept: UROLOGY | Age: 73
End: 2023-02-21

## 2023-02-27 ENCOUNTER — TELEPHONE (OUTPATIENT)
Dept: FAMILY MEDICINE CLINIC | Facility: CLINIC | Age: 73
End: 2023-02-27

## 2023-02-27 DIAGNOSIS — E11.9 TYPE 2 DIABETES MELLITUS WITHOUT COMPLICATION, WITHOUT LONG-TERM CURRENT USE OF INSULIN (HCC): ICD-10-CM

## 2023-02-27 RX ORDER — DULAGLUTIDE 3 MG/.5ML
3 INJECTION, SOLUTION SUBCUTANEOUS
Qty: 12 ADJUSTABLE DOSE PRE-FILLED PEN SYRINGE | Refills: 0 | Status: SHIPPED | OUTPATIENT
Start: 2023-02-27 | End: 2023-03-01 | Stop reason: ALTCHOICE

## 2023-03-01 ENCOUNTER — TELEPHONE (OUTPATIENT)
Dept: FAMILY MEDICINE CLINIC | Facility: CLINIC | Age: 73
End: 2023-03-01

## 2023-03-01 RX ORDER — ORAL SEMAGLUTIDE 7 MG/1
7 TABLET ORAL DAILY
Qty: 30 TABLET | Refills: 0 | Status: SHIPPED | OUTPATIENT
Start: 2023-03-01

## 2023-03-08 ENCOUNTER — OFFICE VISIT (OUTPATIENT)
Dept: UROLOGY | Age: 73
End: 2023-03-08
Payer: COMMERCIAL

## 2023-03-08 ENCOUNTER — TELEPHONE (OUTPATIENT)
Dept: UROLOGY | Age: 73
End: 2023-03-08

## 2023-03-08 DIAGNOSIS — N13.8 BENIGN PROSTATIC HYPERPLASIA WITH URINARY OBSTRUCTION: Primary | ICD-10-CM

## 2023-03-08 DIAGNOSIS — N40.1 BENIGN PROSTATIC HYPERPLASIA WITH URINARY OBSTRUCTION: Primary | ICD-10-CM

## 2023-03-08 DIAGNOSIS — N40.2 PROSTATE NODULE: ICD-10-CM

## 2023-03-08 PROBLEM — R97.20 ELEVATED PSA: Status: ACTIVE | Noted: 2023-03-08

## 2023-03-08 LAB
BILIRUBIN, URINE, POC: NEGATIVE
BLOOD URINE, POC: NEGATIVE
GLUCOSE URINE, POC: 100
KETONES, URINE, POC: NEGATIVE
LEUKOCYTE ESTERASE, URINE, POC: NEGATIVE
NITRITE, URINE, POC: NEGATIVE
PH, URINE, POC: 5 (ref 4.6–8)
PROTEIN,URINE, POC: NEGATIVE
SPECIFIC GRAVITY, URINE, POC: 1.02 (ref 1–1.03)
URINALYSIS CLARITY, POC: NORMAL
URINALYSIS COLOR, POC: NORMAL
UROBILINOGEN, POC: NORMAL

## 2023-03-08 PROCEDURE — 1123F ACP DISCUSS/DSCN MKR DOCD: CPT | Performed by: UROLOGY

## 2023-03-08 PROCEDURE — 99214 OFFICE O/P EST MOD 30 MIN: CPT | Performed by: UROLOGY

## 2023-03-08 PROCEDURE — 81003 URINALYSIS AUTO W/O SCOPE: CPT | Performed by: UROLOGY

## 2023-03-08 RX ORDER — CIPROFLOXACIN 500 MG/1
500 TABLET, FILM COATED ORAL 2 TIMES DAILY
Qty: 6 TABLET | Refills: 0 | Status: SHIPPED | OUTPATIENT
Start: 2023-03-08 | End: 2023-03-11

## 2023-03-08 ASSESSMENT — ENCOUNTER SYMPTOMS: NAUSEA: 0

## 2023-03-08 NOTE — TELEPHONE ENCOUNTER
----- Message from Jesica Dunbar., MD sent at 3/8/2023  9:27 AM EST -----  MRI fusion prostate biopsy  Did orders  Call Garry Laird, granddaughter , who can interpret ([facility]).    [phone]

## 2023-03-08 NOTE — PROGRESS NOTES
[facility]  [address]  [address] [facility], [address] [facility]  [phone]    Jc Marsh  : 1950    Chief Complaint   Patient presents with    Follow-up        HPI     Jc Marsh is a 67 y.o. male referred by primary care physician due to ongoing prostate issues and prostate nodule. Patient is accompanied by his daughter who helps interpret. She tells me that the patient voids a 2-3 times a night. His urine flow and stream have been weak. He has not tried any prostate medication. For several years he has been told that there is a prostate nodule. Due to the ongoing issue PCP referred here for evaluation. PSA blood work has been normal.    PSA 2020 was 0.8  PSA  was 0.9  PSA  is 1.1 in . There is no family history of prostate cancer or prostate issues. RAE in : 1+ enlarged prostate there is nodule felt on the left side. No nontender  He was started on tamsulosin in . His granddaughter is  today. His stream is better but he still gets up 2-4 x/night. On 23: RAE: moderate anal stenosis, prostate not enlarged, prominent 1 cm nodule at left mid gland. MRI pelvis on 2-15-23:   FINDINGS:   Prostate gland size: 4.5 cm transverse x 3.2 cm AP x 3.6 cm craniocaudal.           Peripheral zone: No focal homogeneous hypointense lesion on ADC map to suggest   peripheral zone malignancy. Transition zone:   Lesion 1: There is a 0.8 cm focal homogeneously T2 hypointense lesion (series 3   image 20), located on the anterior right transitional zone in the mid gland at   11 o'clock less than 1 cm from the midline. There is associated focal   hypointensity on the ADC map, and associated hyperintensity on the high b value   diffusion-weighted sequence. Analysis of the dynamic pre- and postgadolinium   kinetics demonstrates findings positive for focal, early enhancement.  PI-RADS   classification: 4 (high probability for the presence of clinically significant   cancer). The prostatic capsule appears intact. The rectoprostatic angle and neurovascular   bundles are unremarkable. The seminal vesicles are symmetric and unremarkable. The urinary bladder is unremarkable. Remainder of the pelvis: No enlarged lymph nodes are identified in the pelvis. No aggressive appearing lesions in the visualized bony pelvis. Impression   1. Lesion 1 (PI-RADS 4): A 0.8 cm lesion located at the anterior right   transitional zone in the mid gland, 11 o'clock, has a PI-RADS classification 4,   high probability for a clinically significant cancer). No enlarged lymph nodes. Past Medical History:   Diagnosis Date    Diabetes mellitus ([facility] [address]     High cholesterol     History of shingles aug 2015    Hypertension      No past surgical history on file.   Current Outpatient Medications   Medication Sig Dispense Refill    ciprofloxacin (CIPRO) 500 MG tablet Take 1 tablet by mouth 2 times daily for 3 days Begin taking 3 days before procedure 6 tablet 0    Semaglutide (RYBELSUS) 7 MG TABS Take 7 mg by mouth daily 30 tablet 0    Accu-Chek Softclix Lancets MISC 1 box by Does not apply route 4 times daily 100 each 3    naproxen (NAPROSYN) 375 MG tablet Take 1 tablet by mouth 2 times daily (with meals) 45 tablet 1    tamsulosin (FLOMAX) 0.4 MG capsule Take 1 capsule by mouth every evening (Patient not taking: Reported on 1/18/2023) 30 capsule 11    naproxen (NAPROSYN) 375 MG tablet Take 1 tablet by mouth 2 times daily (with meals) 45 tablet 1    diclofenac sodium (VOLTAREN) 1 % GEL Apply 2 g topically 4 times daily 150 g 5    metFORMIN (GLUCOPHAGE) 1000 MG tablet Take 1 tablet by mouth 2 times daily 180 tablet 1    nateglinide (STARLIX) 60 MG tablet Take 1 tablet by mouth 3 times daily (before meals) 270 tablet 1    pravastatin (PRAVACHOL) 80 MG tablet Take 1 tablet by mouth daily 90 tablet 1    valsartan (DIOVAN) 320 MG tablet Take 1 tablet by mouth daily 90 tablet 1    Accu-Chek FastClix Lancets MISC Check glucose once daily. DX: E11.65 100 each 1    Blood Glucose Monitoring Suppl (ACCU-CHEK GUIDE) w/Device KIT Check glucose once daily. DX: E11.65 1 kit 0    blood glucose test strips (ACCU-CHEK GUIDE) strip Check glucose once daily. DX: E11.65 100 each 1     No current facility-administered medications for this visit. Allergies   Allergen Reactions    Aspirin      Social History     Socioeconomic History    Marital status:      Spouse name: Not on file    Number of children: Not on file    Years of education: Not on file    Highest education level: Not on file   Occupational History    Not on file   Tobacco Use    Smoking status: Former     Packs/day: 1.00     Years: 16.00     Pack years: 16.00     Types: Cigarettes     Quit date: 1980     Years since quittin.2    Smokeless tobacco: Never   Substance and Sexual Activity    Alcohol use: Yes     Alcohol/week: 0.0 standard drinks    Drug use: Not on file    Sexual activity: Not on file   Other Topics Concern    Not on file   Social History Narrative    Not on file     Social Determinants of Health     Financial Resource Strain: Not on file   Food Insecurity: Not on file   Transportation Needs: Not on file   Physical Activity: Insufficiently Active    Days of Exercise per Week: 4 days    Minutes of Exercise per Session: 20 min   Stress: Not on file   Social Connections: Not on file   Intimate Partner Violence: Not on file   Housing Stability: Not on file     No family history on file. Review of Systems  Constitutional:   Negative for fever. GI:  Negative for nausea. Genitourinary:  Negative for flank pain. There were no vitals taken for this visit. Physical Exam  General   Mental Status - Patient is alert and oriented X3. Build & Nutrition - Well nourished.       Chest and Lung Exam   Chest and lung exam reveals  - normal excursion with symmetric chest walls, quiet, even and easy respiratory effort with no use of accessory muscles and on auscultation, normal breath sounds, no adventitious sounds and normal vocal resonance. Cardiovascular   Cardiovascular examination reveals  - normal heart sounds, regular rate and rhythm with no murmurs. Abdomen   Palpation/Percussion: Palpation and Percussion of the abdomen reveal - Non Tender, No Rebound tenderness, No Rigidity (guarding), No hepatosplenomegaly, No Palpable abdominal masses and Soft. Hernia - Bilateral - No Hernia(s) present. Urinalysis  UA - Dipstick  Results for orders placed or performed in visit on 03/08/23   AMB POC URINALYSIS DIP STICK AUTO W/O MICRO   Result Value Ref Range    Color (UA POC)      Clarity (UA POC)      Glucose, Urine,  Negative    Bilirubin, Urine, POC Negative Negative    KETONES, Urine, POC Negative Negative    Specific Gravity, Urine, POC 1.025 1.001 - 1.035    Blood (UA POC) Negative Negative    pH, Urine, POC 5.0 4.6 - 8.0    Protein, Urine, POC Negative Negative    Urobilinogen, POC 0.2 mg/dL     Nitrite, Urine, POC Negative Negative    Leukocyte Esterase, Urine, POC Negative Negative       UA - Micro  WBC - 0  RBC - 0  Bacteria - 0  Epith - 0    Physical Exam    Assessment and Plan    Rena was seen today for follow-up. Diagnoses and all orders for this visit:    Benign prostatic hyperplasia with urinary obstruction  -     AMB POC URINALYSIS DIP STICK AUTO W/O MICRO    Prostate nodule  -     ciprofloxacin (CIPRO) 500 MG tablet; Take 1 tablet by mouth 2 times daily for 3 days Begin taking 3 days before procedure    Other orders  -     Full code; Standing  -     Diet NPO Exceptions are: Sips of Water with Meds; Standing  -     Verify surgical site confirmation documentation completed; Standing  -     Verify informed consent; Standing  -     Verify pre-procedure history and physical completed; Standing  -     Procedure Consent; Standing  -     Urinalysis;  Standing  -     Place intermittent pneumatic compression device; Standing  -     cefTRIAXone (ROCEPHIN) 1,000 mg in sodium chloride 0.9 % 50 mL IVPB   Discussed the MRI findings with his granddaughter, who served as . I recommend MRI fusion prostate biopsy. INFORMED CONSENT: The nature of the needle biopsy and the ultrasound and the  purpose was discussed with the patient. Risks include, but are not limited tobleeding into the urethra, bladder or rectum, infection, local pain, difficulty voiding and urinary retention requiring catheterization, inability to get an adequate amount of specimen for diagnosis, false positives and negatives, the need for further testing or open procedures based on biopsy results, and side effects from local anesthesia. The benefits are judged to outweigh the risks, should any of these complications occur, and no guarantees of success or outcome were given or implied. He states he has no further questions and agreed to proceed. Follow-up and Dispositions    Return for call [facility] or [facility] to schedule surgery.

## 2023-03-08 NOTE — TELEPHONE ENCOUNTER
Procedures: Procedure(s):   MRI FUSION PROSTATE BIOPSY   Date: 3/28/2023   Time: 1200   Location: [facility] OP OR 02     Scheduled.

## 2023-03-21 ENCOUNTER — OFFICE VISIT (OUTPATIENT)
Dept: FAMILY MEDICINE CLINIC | Facility: CLINIC | Age: 73
End: 2023-03-21
Payer: COMMERCIAL

## 2023-03-21 VITALS
DIASTOLIC BLOOD PRESSURE: 79 MMHG | OXYGEN SATURATION: 96 % | HEART RATE: 67 BPM | WEIGHT: 185.4 LBS | BODY MASS INDEX: 29.04 KG/M2 | SYSTOLIC BLOOD PRESSURE: 123 MMHG

## 2023-03-21 DIAGNOSIS — E11.9 TYPE 2 DIABETES MELLITUS WITHOUT COMPLICATION, WITHOUT LONG-TERM CURRENT USE OF INSULIN (HCC): Primary | ICD-10-CM

## 2023-03-21 DIAGNOSIS — E78.5 DYSLIPIDEMIA: ICD-10-CM

## 2023-03-21 DIAGNOSIS — I10 ESSENTIAL HYPERTENSION: ICD-10-CM

## 2023-03-21 LAB
ALBUMIN, URINE, POC: 10 MG/L
ANION GAP SERPL CALC-SCNC: 5 MMOL/L (ref 2–11)
BUN SERPL-MCNC: 15 MG/DL (ref 8–23)
CALCIUM SERPL-MCNC: 9.5 MG/DL (ref 8.3–10.4)
CHLORIDE SERPL-SCNC: 104 MMOL/L (ref 101–110)
CHOLEST SERPL-MCNC: 121 MG/DL
CO2 SERPL-SCNC: 29 MMOL/L (ref 21–32)
CREAT SERPL-MCNC: 1.2 MG/DL (ref 0.8–1.5)
CREATININE, URINE, POC: 200 MG/DL
GLUCOSE SERPL-MCNC: 188 MG/DL (ref 65–100)
HDLC SERPL-MCNC: 45 MG/DL (ref 40–60)
HDLC SERPL: 2.7
LDLC SERPL CALC-MCNC: 45.6 MG/DL
MICROALB/CREAT RATIO, POC: <30 MG/G
POTASSIUM SERPL-SCNC: 4.5 MMOL/L (ref 3.5–5.1)
SODIUM SERPL-SCNC: 138 MMOL/L (ref 133–143)
TRIGL SERPL-MCNC: 152 MG/DL (ref 35–150)
VLDLC SERPL CALC-MCNC: 30.4 MG/DL (ref 6–23)

## 2023-03-21 PROCEDURE — 1123F ACP DISCUSS/DSCN MKR DOCD: CPT | Performed by: FAMILY MEDICINE

## 2023-03-21 PROCEDURE — 99214 OFFICE O/P EST MOD 30 MIN: CPT | Performed by: FAMILY MEDICINE

## 2023-03-21 PROCEDURE — 3074F SYST BP LT 130 MM HG: CPT | Performed by: FAMILY MEDICINE

## 2023-03-21 PROCEDURE — 3078F DIAST BP <80 MM HG: CPT | Performed by: FAMILY MEDICINE

## 2023-03-21 PROCEDURE — 82044 UR ALBUMIN SEMIQUANTITATIVE: CPT | Performed by: FAMILY MEDICINE

## 2023-03-21 RX ORDER — GINKGO BILOBA 40 MG
CAPSULE ORAL
COMMUNITY

## 2023-03-21 SDOH — ECONOMIC STABILITY: HOUSING INSECURITY
IN THE LAST 12 MONTHS, WAS THERE A TIME WHEN YOU DID NOT HAVE A STEADY PLACE TO SLEEP OR SLEPT IN A SHELTER (INCLUDING NOW)?: NO

## 2023-03-21 SDOH — ECONOMIC STABILITY: FOOD INSECURITY: WITHIN THE PAST 12 MONTHS, THE FOOD YOU BOUGHT JUST DIDN'T LAST AND YOU DIDN'T HAVE MONEY TO GET MORE.: NEVER TRUE

## 2023-03-21 SDOH — ECONOMIC STABILITY: INCOME INSECURITY: HOW HARD IS IT FOR YOU TO PAY FOR THE VERY BASICS LIKE FOOD, HOUSING, MEDICAL CARE, AND HEATING?: NOT HARD AT ALL

## 2023-03-21 SDOH — ECONOMIC STABILITY: FOOD INSECURITY: WITHIN THE PAST 12 MONTHS, YOU WORRIED THAT YOUR FOOD WOULD RUN OUT BEFORE YOU GOT MONEY TO BUY MORE.: NEVER TRUE

## 2023-03-21 ASSESSMENT — PATIENT HEALTH QUESTIONNAIRE - PHQ9
SUM OF ALL RESPONSES TO PHQ9 QUESTIONS 1 & 2: 0
SUM OF ALL RESPONSES TO PHQ QUESTIONS 1-9: 0
SUM OF ALL RESPONSES TO PHQ QUESTIONS 1-9: 0
2. FEELING DOWN, DEPRESSED OR HOPELESS: 0
SUM OF ALL RESPONSES TO PHQ QUESTIONS 1-9: 0
SUM OF ALL RESPONSES TO PHQ QUESTIONS 1-9: 0
1. LITTLE INTEREST OR PLEASURE IN DOING THINGS: 0

## 2023-03-21 NOTE — PROGRESS NOTES
he quit smoking about 43 years ago. His smoking use included cigarettes. He has a 16.00 pack-year smoking history. He has never used smokeless tobacco.    Objective:  Blood pressure 123/79, pulse 67, weight 185 lb 6.4 oz (84.1 kg), SpO2 96 %. Body mass index is 29.04 kg/m². BP Readings from Last 3 Encounters:   03/21/23 123/79   11/22/22 128/72   09/20/22 120/70     General- Pleasant and no distress  Psych- alert and oriented to person, place and time  Mood and affect are appropriate to situation  Neurological- grossly intact. rrr s mrg.   bcta  I reviewed the patient's Diabetic Questionnaire and HTN Questionnaires above. Assessment:  1. Type 2 diabetes mellitus without complication, without long-term current use of insulin ([address]    2. Essential hypertension    3. Dyslipidemia        Plan:   I would like to see you back for a complete diabetic examination, at which time we will have the convenience of having lab work back to discuss, and then we can review the results face to face instead of over the phone. We will then make any adjustments necessary to the medications and/or diet/treatment plan. 1. Type 2 diabetes mellitus without complication, without long-term current use of insulin ([address]  -     Basic Metabolic Panel; Future  -     Hemoglobin A1C; Future  -     AMB POC URINE, MICROALBUMIN, SEMIQUANT (3 RESULTS)  2. Essential hypertension  -     Basic Metabolic Panel; Future  3. Dyslipidemia  -     Lipid Panel; Future    Followup:  Diabetic physician exam in a week or so, and we will review labs then and make any new treatment adjustments or recommendations at that time. Return has part 2 on 28th.

## 2023-03-22 LAB
EST. AVERAGE GLUCOSE BLD GHB EST-MCNC: 163 MG/DL
HBA1C MFR BLD: 7.3 % (ref 4.8–5.6)

## 2023-03-27 ENCOUNTER — ANESTHESIA EVENT (OUTPATIENT)
Dept: SURGERY | Age: 73
End: 2023-03-27
Payer: COMMERCIAL

## 2023-03-28 ENCOUNTER — OFFICE VISIT (OUTPATIENT)
Dept: FAMILY MEDICINE CLINIC | Facility: CLINIC | Age: 73
End: 2023-03-28
Payer: COMMERCIAL

## 2023-03-28 ENCOUNTER — HOSPITAL ENCOUNTER (OUTPATIENT)
Age: 73
Setting detail: OUTPATIENT SURGERY
Discharge: HOME | End: 2023-03-28
Attending: UROLOGY | Admitting: UROLOGY
Payer: COMMERCIAL

## 2023-03-28 ENCOUNTER — ANESTHESIA (OUTPATIENT)
Dept: SURGERY | Age: 73
End: 2023-03-28
Payer: COMMERCIAL

## 2023-03-28 VITALS
HEART RATE: 72 BPM | DIASTOLIC BLOOD PRESSURE: 78 MMHG | WEIGHT: 184.2 LBS | SYSTOLIC BLOOD PRESSURE: 122 MMHG | BODY MASS INDEX: 28.85 KG/M2 | OXYGEN SATURATION: 96 %

## 2023-03-28 VITALS
SYSTOLIC BLOOD PRESSURE: 137 MMHG | WEIGHT: 184 LBS | RESPIRATION RATE: 18 BRPM | HEIGHT: 67 IN | DIASTOLIC BLOOD PRESSURE: 61 MMHG | HEART RATE: 72 BPM | TEMPERATURE: 97.6 F | BODY MASS INDEX: 28.88 KG/M2 | OXYGEN SATURATION: 97 %

## 2023-03-28 DIAGNOSIS — I10 ESSENTIAL HYPERTENSION: ICD-10-CM

## 2023-03-28 DIAGNOSIS — N40.2 PROSTATE NODULE: ICD-10-CM

## 2023-03-28 DIAGNOSIS — R97.20 ELEVATED PSA: ICD-10-CM

## 2023-03-28 DIAGNOSIS — E11.9 TYPE 2 DIABETES MELLITUS WITHOUT COMPLICATION, WITHOUT LONG-TERM CURRENT USE OF INSULIN (HCC): Primary | ICD-10-CM

## 2023-03-28 DIAGNOSIS — E78.5 DYSLIPIDEMIA: ICD-10-CM

## 2023-03-28 LAB
APPEARANCE UR: CLEAR
BILIRUB UR QL: NEGATIVE
COLOR UR: NORMAL
GLUCOSE BLD STRIP.AUTO-MCNC: 164 MG/DL (ref 65–100)
GLUCOSE UR STRIP.AUTO-MCNC: 250 MG/DL
HGB UR QL STRIP: NEGATIVE
KETONES UR QL STRIP.AUTO: NEGATIVE MG/DL
LEUKOCYTE ESTERASE UR QL STRIP.AUTO: NEGATIVE
NITRITE UR QL STRIP.AUTO: NEGATIVE
PH UR STRIP: 5 (ref 5–9)
PROT UR STRIP-MCNC: NEGATIVE MG/DL
SERVICE CMNT-IMP: ABNORMAL
SP GR UR REFRACTOMETRY: 1.02 (ref 1–1.02)
UROBILINOGEN UR QL STRIP.AUTO: 1 EU/DL (ref 0.2–1)

## 2023-03-28 PROCEDURE — 99214 OFFICE O/P EST MOD 30 MIN: CPT | Performed by: FAMILY MEDICINE

## 2023-03-28 PROCEDURE — 1123F ACP DISCUSS/DSCN MKR DOCD: CPT | Performed by: FAMILY MEDICINE

## 2023-03-28 PROCEDURE — 3078F DIAST BP <80 MM HG: CPT | Performed by: FAMILY MEDICINE

## 2023-03-28 PROCEDURE — 7100000001 HC PACU RECOVERY - ADDTL 15 MIN: Performed by: UROLOGY

## 2023-03-28 PROCEDURE — 81003 URINALYSIS AUTO W/O SCOPE: CPT

## 2023-03-28 PROCEDURE — 3700000001 HC ADD 15 MINUTES (ANESTHESIA): Performed by: UROLOGY

## 2023-03-28 PROCEDURE — 3051F HG A1C>EQUAL 7.0%<8.0%: CPT | Performed by: FAMILY MEDICINE

## 2023-03-28 PROCEDURE — 3700000000 HC ANESTHESIA ATTENDED CARE: Performed by: UROLOGY

## 2023-03-28 PROCEDURE — 82962 GLUCOSE BLOOD TEST: CPT

## 2023-03-28 PROCEDURE — 2709999900 HC NON-CHARGEABLE SUPPLY: Performed by: UROLOGY

## 2023-03-28 PROCEDURE — 3600000012 HC SURGERY LEVEL 2 ADDTL 15MIN: Performed by: UROLOGY

## 2023-03-28 PROCEDURE — 7100000010 HC PHASE II RECOVERY - FIRST 15 MIN: Performed by: UROLOGY

## 2023-03-28 PROCEDURE — 2580000003 HC RX 258: Performed by: UROLOGY

## 2023-03-28 PROCEDURE — 6360000002 HC RX W HCPCS: Performed by: UROLOGY

## 2023-03-28 PROCEDURE — 88305 TISSUE EXAM BY PATHOLOGIST: CPT

## 2023-03-28 PROCEDURE — 3074F SYST BP LT 130 MM HG: CPT | Performed by: FAMILY MEDICINE

## 2023-03-28 PROCEDURE — 2580000003 HC RX 258: Performed by: ANESTHESIOLOGY

## 2023-03-28 PROCEDURE — 3600000002 HC SURGERY LEVEL 2 BASE: Performed by: UROLOGY

## 2023-03-28 PROCEDURE — 7100000000 HC PACU RECOVERY - FIRST 15 MIN: Performed by: UROLOGY

## 2023-03-28 PROCEDURE — 6360000002 HC RX W HCPCS: Performed by: NURSE ANESTHETIST, CERTIFIED REGISTERED

## 2023-03-28 RX ORDER — SODIUM CHLORIDE 9 MG/ML
INJECTION, SOLUTION INTRAVENOUS PRN
Status: DISCONTINUED | OUTPATIENT
Start: 2023-03-28 | End: 2023-03-28 | Stop reason: HOSPADM

## 2023-03-28 RX ORDER — PROCHLORPERAZINE EDISYLATE 5 MG/ML
5 INJECTION INTRAMUSCULAR; INTRAVENOUS
Status: DISCONTINUED | OUTPATIENT
Start: 2023-03-28 | End: 2023-03-28 | Stop reason: HOSPADM

## 2023-03-28 RX ORDER — PRAVASTATIN SODIUM 80 MG/1
80 TABLET ORAL DAILY
Qty: 90 TABLET | Refills: 1 | Status: SHIPPED | OUTPATIENT
Start: 2023-03-28

## 2023-03-28 RX ORDER — ACETAMINOPHEN 325 MG/1
650 TABLET ORAL ONCE
Status: DISCONTINUED | OUTPATIENT
Start: 2023-03-28 | End: 2023-03-28 | Stop reason: HOSPADM

## 2023-03-28 RX ORDER — HYDROMORPHONE HYDROCHLORIDE 2 MG/ML
0.25 INJECTION, SOLUTION INTRAMUSCULAR; INTRAVENOUS; SUBCUTANEOUS EVERY 5 MIN PRN
Status: DISCONTINUED | OUTPATIENT
Start: 2023-03-28 | End: 2023-03-28 | Stop reason: HOSPADM

## 2023-03-28 RX ORDER — DIPHENHYDRAMINE HYDROCHLORIDE 50 MG/ML
12.5 INJECTION INTRAMUSCULAR; INTRAVENOUS
Status: DISCONTINUED | OUTPATIENT
Start: 2023-03-28 | End: 2023-03-28 | Stop reason: HOSPADM

## 2023-03-28 RX ORDER — ONDANSETRON 2 MG/ML
4 INJECTION INTRAMUSCULAR; INTRAVENOUS
Status: DISCONTINUED | OUTPATIENT
Start: 2023-03-28 | End: 2023-03-28 | Stop reason: HOSPADM

## 2023-03-28 RX ORDER — SODIUM CHLORIDE 0.9 % (FLUSH) 0.9 %
5-40 SYRINGE (ML) INJECTION PRN
Status: DISCONTINUED | OUTPATIENT
Start: 2023-03-28 | End: 2023-03-28 | Stop reason: HOSPADM

## 2023-03-28 RX ORDER — NATEGLINIDE 60 MG/1
60 TABLET ORAL
Qty: 270 TABLET | Refills: 1 | Status: SHIPPED | OUTPATIENT
Start: 2023-03-28

## 2023-03-28 RX ORDER — ORAL SEMAGLUTIDE 7 MG/1
7 TABLET ORAL DAILY
Qty: 30 TABLET | Refills: 0 | Status: CANCELLED | OUTPATIENT
Start: 2023-03-28

## 2023-03-28 RX ORDER — OXYCODONE HYDROCHLORIDE 5 MG/1
10 TABLET ORAL PRN
Status: DISCONTINUED | OUTPATIENT
Start: 2023-03-28 | End: 2023-03-28 | Stop reason: HOSPADM

## 2023-03-28 RX ORDER — VALSARTAN 320 MG/1
320 TABLET ORAL DAILY
Qty: 90 TABLET | Refills: 1 | Status: SHIPPED | OUTPATIENT
Start: 2023-03-28

## 2023-03-28 RX ORDER — HYDROMORPHONE HYDROCHLORIDE 2 MG/ML
0.5 INJECTION, SOLUTION INTRAMUSCULAR; INTRAVENOUS; SUBCUTANEOUS EVERY 5 MIN PRN
Status: DISCONTINUED | OUTPATIENT
Start: 2023-03-28 | End: 2023-03-28 | Stop reason: HOSPADM

## 2023-03-28 RX ORDER — MIDAZOLAM HYDROCHLORIDE 2 MG/2ML
2 INJECTION, SOLUTION INTRAMUSCULAR; INTRAVENOUS
Status: DISCONTINUED | OUTPATIENT
Start: 2023-03-28 | End: 2023-03-28 | Stop reason: HOSPADM

## 2023-03-28 RX ORDER — ORAL SEMAGLUTIDE 14 MG/1
1 TABLET ORAL
Qty: 12 TABLET | Refills: 1 | Status: SHIPPED | OUTPATIENT
Start: 2023-03-28

## 2023-03-28 RX ORDER — OXYCODONE HYDROCHLORIDE 5 MG/1
5 TABLET ORAL PRN
Status: DISCONTINUED | OUTPATIENT
Start: 2023-03-28 | End: 2023-03-28 | Stop reason: HOSPADM

## 2023-03-28 RX ORDER — SODIUM CHLORIDE, SODIUM LACTATE, POTASSIUM CHLORIDE, CALCIUM CHLORIDE 600; 310; 30; 20 MG/100ML; MG/100ML; MG/100ML; MG/100ML
INJECTION, SOLUTION INTRAVENOUS CONTINUOUS
Status: DISCONTINUED | OUTPATIENT
Start: 2023-03-28 | End: 2023-03-28 | Stop reason: HOSPADM

## 2023-03-28 RX ORDER — LIDOCAINE HYDROCHLORIDE 10 MG/ML
1 INJECTION, SOLUTION INFILTRATION; PERINEURAL
Status: DISCONTINUED | OUTPATIENT
Start: 2023-03-28 | End: 2023-03-28 | Stop reason: HOSPADM

## 2023-03-28 RX ORDER — PROPOFOL 10 MG/ML
INJECTION, EMULSION INTRAVENOUS PRN
Status: DISCONTINUED | OUTPATIENT
Start: 2023-03-28 | End: 2023-03-28 | Stop reason: SDUPTHER

## 2023-03-28 RX ORDER — SODIUM CHLORIDE 0.9 % (FLUSH) 0.9 %
5-40 SYRINGE (ML) INJECTION EVERY 12 HOURS SCHEDULED
Status: DISCONTINUED | OUTPATIENT
Start: 2023-03-28 | End: 2023-03-28 | Stop reason: HOSPADM

## 2023-03-28 RX ORDER — NAPROXEN 375 MG/1
375 TABLET ORAL 2 TIMES DAILY WITH MEALS
Qty: 45 TABLET | Refills: 1 | Status: SHIPPED | OUTPATIENT
Start: 2023-03-28

## 2023-03-28 RX ADMIN — CEFTRIAXONE 1000 MG: 1 INJECTION, POWDER, FOR SOLUTION INTRAMUSCULAR; INTRAVENOUS at 14:14

## 2023-03-28 RX ADMIN — SODIUM CHLORIDE, POTASSIUM CHLORIDE, SODIUM LACTATE AND CALCIUM CHLORIDE: 600; 310; 30; 20 INJECTION, SOLUTION INTRAVENOUS at 11:55

## 2023-03-28 RX ADMIN — PROPOFOL 80 MG: 10 INJECTION, EMULSION INTRAVENOUS at 14:25

## 2023-03-28 RX ADMIN — PROPOFOL 25 MG: 10 INJECTION, EMULSION INTRAVENOUS at 14:28

## 2023-03-28 RX ADMIN — PROPOFOL 25 MG: 10 INJECTION, EMULSION INTRAVENOUS at 14:36

## 2023-03-28 RX ADMIN — PROPOFOL 25 MG: 10 INJECTION, EMULSION INTRAVENOUS at 14:32

## 2023-03-28 ASSESSMENT — PATIENT HEALTH QUESTIONNAIRE - PHQ9
1. LITTLE INTEREST OR PLEASURE IN DOING THINGS: 0
SUM OF ALL RESPONSES TO PHQ9 QUESTIONS 1 & 2: 0
SUM OF ALL RESPONSES TO PHQ QUESTIONS 1-9: 0
2. FEELING DOWN, DEPRESSED OR HOPELESS: 0
SUM OF ALL RESPONSES TO PHQ QUESTIONS 1-9: 0

## 2023-03-28 ASSESSMENT — PAIN - FUNCTIONAL ASSESSMENT: PAIN_FUNCTIONAL_ASSESSMENT: 0-10

## 2023-03-28 NOTE — PROGRESS NOTES
When using family members to interpret, for the safety of the patient and protection of the communication of both our patient and [facility] staff the VRI or telephonic  should remain on the line to monitor that all communication is accurate and complete. The  should be instructed to notify [facility] staff immediately if there are any inaccuracies.      Thank you,

## 2023-03-28 NOTE — H&P
tablet Take 1 tablet by mouth daily 90 tablet 1    Accu-Chek FastClix Lancets MISC Check glucose once daily. DX: E11.65 100 each 1    Blood Glucose Monitoring Suppl (ACCU-CHEK GUIDE) w/Device KIT Check glucose once daily. DX: E11.65 1 kit 0    blood glucose test strips (ACCU-CHEK GUIDE) strip Check glucose once daily. DX: E11.65 100 each 1      No current facility-administered medications for this visit. Allergies   Allergen Reactions    Aspirin        Social History               Socioeconomic History    Marital status:        Spouse name: Not on file    Number of children: Not on file    Years of education: Not on file    Highest education level: Not on file   Occupational History    Not on file   Tobacco Use    Smoking status: Former       Packs/day: 1.00       Years: 16.00       Pack years: 16.00       Types: Cigarettes       Quit date: 1980       Years since quittin.2    Smokeless tobacco: Never   Substance and Sexual Activity    Alcohol use: Yes       Alcohol/week: 0.0 standard drinks    Drug use: Not on file    Sexual activity: Not on file   Other Topics Concern    Not on file   Social History Narrative    Not on file      Social Determinants of Health          Financial Resource Strain: Not on file   Food Insecurity: Not on file   Transportation Needs: Not on file   Physical Activity: Insufficiently Active    Days of Exercise per Week: 4 days    Minutes of Exercise per Session: 20 min   Stress: Not on file   Social Connections: Not on file   Intimate Partner Violence: Not on file   Housing Stability: Not on file         Family History   No family history on file. Review of Systems  Constitutional:   Negative for fever. GI:  Negative for nausea. Genitourinary:  Negative for flank pain. There were no vitals taken for this visit. Physical Exam  General   Mental Status - Patient is alert and oriented X3. Build & Nutrition - Well nourished.         Chest and Lung Exam

## 2023-03-28 NOTE — DISCHARGE INSTRUCTIONS
Keep 4-12-23    Prostate Biopsy and Ultrasound:   A prostate biopsy is a type of test. Your doctor takes small tissue samples from your prostate gland. Then another doctor looks at the tissue under a microscope to see if there are cancer cells. This test is done by a doctor who specializes in men's genital and urinary problems (urologist). It can be done in your doctor's office, a day surgery clinic, or a hospital operating room. To get the tissue samples from the prostate, the doctor inserts a thin needle through the rectum, the urethra, or the area between the anus and scrotum (perineum). The most common method is through the rectum. Your doctor may use ultrasound to help guide the needle. What else should you know about this test?  A prostate biopsy has a slight risk of causing problems such as infection or bleeding. If the biopsy went through your rectum, you may have a small amount of bleeding from your rectum for 2 to 3 days after the biopsy. You may have a little pain in your pelvic area. You may also have a little blood in your urine for 1 to 5 days. You may have some blood in your semen for a week or longer. Do not do heavy work or exercise for 4 hours after the test.  Your doctor will tell you how long it may take to get your results back. Follow-up care is a key part of your treatment and safety. Be sure to make and go to all appointments, and call your doctor if you are having problems. It's also a good idea to keep a list of the medicines you take. Ask your doctor when you can expect to have your test results.     After general anesthesia or intravenous sedation, for 24 hours or while taking prescription Narcotics:  Limit your activities  A responsible adult needs to be with you for the next 24 hours  Do not drive and operate hazardous machinery  Do not make important personal or business decisions  Do not drink alcoholic beverages  If you have not urinated within 8 hours after discharge, and you

## 2023-03-28 NOTE — ANESTHESIA PRE PROCEDURE
Department of Anesthesiology  Preprocedure Note       Name:  Jc Marsh   Age:  67 y.o.  :  1950                                          MRN:  [id number]         Date:  3/28/2023      Surgeon: Yuko Lantigua):  Janina Montiel MD    Procedure: Procedure(s):  MRI FUSION PROSTATE BIOPSY    Medications prior to admission:   Prior to Admission medications    Medication Sig Start Date End Date Taking? Authorizing Provider   naproxen (NAPROSYN) 375 MG tablet Take 1 tablet by mouth 2 times daily (with meals) 3/28/23   Nathaniel Gutierrez MD   metFORMIN (GLUCOPHAGE) 1000 MG tablet Take 1 tablet by mouth 2 times daily 3/28/23   Nathaniel Gutierrez MD   nateglinide (STARLIX) 60 MG tablet Take 1 tablet by mouth 3 times daily (before meals) 3/28/23   Nathaniel Gutierrez MD   pravastatin (PRAVACHOL) 80 MG tablet Take 1 tablet by mouth daily 3/28/23   Nathaniel Gutierrez MD   valsartan (DIOVAN) 320 MG tablet Take 1 tablet by mouth daily 3/28/23   Nathaniel Gutierrez MD   Semaglutide (RYBELSUS) 14 MG TABS Take 1 tablet by mouth every 7 days 3/28/23   Nathaniel Gutierrez MD   Ginkgo Biloba 40 MG CAPS Take by mouth    Historical Provider, MD   Semaglutide (RYBELSUS) 7 MG TABS Take 7 mg by mouth daily 3/1/23   Nathaniel Gutierrez MD   Accu-Chek Softclix Lancets MISC 1 box by Does not apply route 4 times daily 23   Nathaniel Gutierrez MD   Blood Glucose Monitoring Suppl (ACCU-CHEK GUIDE) w/Device KIT Check glucose once daily. DX: E11.65 22   Nathaniel Gutierrez MD   blood glucose test strips (ACCU-CHEK GUIDE) strip Check glucose once daily.  DX: E11.65 22   Nathaniel Gutierrez MD       Current medications:    Current Facility-Administered Medications   Medication Dose Route Frequency Provider Last Rate Last Admin    lidocaine 1 % injection 1 mL  1 mL IntraDERmal Once PRN Hoang Bejarano IV, MD        lactated ringers IV soln infusion   IntraVENous Continuous Hoang Bejarano IV,  mL/hr at 23 1155 New Bag at 23 1155    sodium chloride flush

## 2023-03-28 NOTE — OP NOTE
Operative Note      Patient: Jc Marsh  YOB: 1950  MRN: [id number]    Date of Procedure: 3/28/2023    Pre-Op Diagnosis: Elevated PSA [R97.20]    Post-Op Diagnosis: Same       Procedure(s):  MRI FUSION PROSTATE BIOPSY    Surgeon(s):  Janina Montiel MD    Assistant:   * No surgical staff found *    Anesthesia: Monitor Anesthesia Care    Estimated Blood Loss (mL): Minimal    Complications: None    Specimens:   ID Type Source Tests Collected by Time Destination   A : RB Tissue Prostate SURGICAL PATHOLOGY Janina Montiel MD 3/28/2023 1145    B : RM Tissue Prostate SURGICAL PATHOLOGY Janina Montiel MD 3/28/2023 1146    C : RA Tissue Prostate SURGICAL PATHOLOGY Janina Montiel MD 3/28/2023 1146    D : RLB Tissue Prostate SURGICAL PATHOLOGY Janina Montiel MD 3/28/2023 1146    E : RLM Tissue Prostate SURGICAL PATHOLOGY Janina Montiel MD 3/28/2023 1146    F : RLA Tissue Prostate SURGICAL PATHOLOGY Janina Montiel MD 3/28/2023 1146    G : LB Tissue Prostate SURGICAL PATHOLOGY Janina Montiel MD 3/28/2023 1147    H : LM Tissue Prostate SURGICAL PATHOLOGY Janina Montiel MD 3/28/2023 1147    I : LA Tissue Prostate SURGICAL PATHOLOGY Janina Montiel MD 3/28/2023 1147    J : LLB Tissue Prostate SURGICAL PATHOLOGY Janina Montiel MD 3/28/2023 1147    K : LLM Tissue Prostate SURGICAL PATHOLOGY Janina Montiel MD 3/28/2023 1147    L : LLA Tissue Prostate SURGICAL PATHOLOGY Janina Montiel MD 3/28/2023 1147    M : SEEMA # 1, RIGHT ANTERIOR Tissue Prostate SURGICAL PATHOLOGY Janina Montiel MD 3/28/2023 1426        Implants:  * No implants in log *      Drains: * No LDAs found *    Findings: see op note    Detailed Description of Procedure:   Operative Note                Patient: Jc Marsh, [id number]    Date of Surgery: 03/28/23    Preoperative Diagnosis: Elevated psa and prostate lesion(s) on MRI imaging    Postoperative Diagnosis:  same    Surgeon(s)

## 2023-03-28 NOTE — PERIOP NOTE
Preop department called to notify patient of arrival time for scheduled procedure. Instructions given to   - Arrive at [address]. - Remain NPO after midnight, unless otherwise indicated, including gum, mints, and ice chips. - Have a responsible adult to drive patient to the hospital, stay during surgery, and patient will need supervision 24 hours after anesthesia. - Use antibacterial soap in shower the night before surgery and on the morning of surgery.        Was patient contacted: granddaughter  Voicemail left:   Numbers contacted: [phone]   Arrival time: 1100
Waiver of interpreting services signed by patient and placed in patients chart. Pt request Hasshantal Haver, granddaughter to interpret.
Artificial nails are not permitted.

## 2023-03-28 NOTE — ANESTHESIA POSTPROCEDURE EVALUATION
Department of Anesthesiology  Postprocedure Note    Patient: Kacey Guillory  MRN: [id number]  YOB: 1950  Date of evaluation: 3/28/2023      Procedure Summary     Date: 03/28/23 Room / Location: [facility] OP OR 06 / [facility]    Anesthesia Start: 1405 Anesthesia Stop: 1446    Procedure: MRI FUSION PROSTATE BIOPSY (Rectum) Diagnosis:       Elevated PSA      (Elevated PSA [R97.20])    Surgeons: Eduard Olmos MD Responsible Provider: Dilip Reilly MD    Anesthesia Type: TIVA ASA Status: 2          Anesthesia Type: No value filed.     Kenny Phase I: Kenny Score: 10    Kenny Phase II:        Anesthesia Post Evaluation    Patient location during evaluation: PACU  Patient participation: complete - patient participated  Level of consciousness: awake and alert  Airway patency: patent  Nausea & Vomiting: no nausea and no vomiting  Complications: no  Cardiovascular status: hemodynamically stable  Respiratory status: acceptable, nonlabored ventilation and spontaneous ventilation  Hydration status: euvolemic  Comments: /66   Pulse 68   Temp 97.6 °F (36.4 °C) (Temporal)   Resp 30   Ht 5' 7\" (1.702 m)   Wt 184 lb (83.5 kg)   SpO2 94%   BMI 28.82 kg/m²     Multimodal analgesia pain management approach

## 2023-03-28 NOTE — PROGRESS NOTES
some time reviewing recent labs, there implications, and our plan for current treatment and long term goals. Assessment:  1. Type 2 diabetes mellitus without complication, without long-term current use of insulin ([address]    2. Dyslipidemia    3. Essential hypertension    4. Prostate nodule        Plan:   Prescription drug management occurs today as follows: No adjustment in diabetic medications needed; keep up the good work  Personal instruction is given. For your information, consider the following: significant weight loss can result in a drop of 5-10mm of blood pressure! The \"DASH\" diet (see bookstore or go to www."[employer] and print the DASH diet) will lower 8-14mm of pressure. The ADA recommends a target bp of <140/90 for most patients with diabetes but for high risk patients such as those with heart disease, a history of stents, angioplasty, heart attacks, strokes, diabetes and chronic kidney disease, your goal is 130/80. Martin De Luna has been given the following recommendations today due to his elevated BP reading: continue meds    1. Type 2 diabetes mellitus without complication, without long-term current use of insulin (HCC)  -     metFORMIN (GLUCOPHAGE) 1000 MG tablet; Take 1 tablet by mouth 2 times daily, Disp-180 tablet, R-1Normal  -     nateglinide (STARLIX) 60 MG tablet; Take 1 tablet by mouth 3 times daily (before meals), Disp-270 tablet, R-1Normal  -     Semaglutide (RYBELSUS) 14 MG TABS; Take 1 tablet by mouth every 7 days, Disp-12 tablet, R-1Normal  2. Dyslipidemia  -     pravastatin (PRAVACHOL) 80 MG tablet; Take 1 tablet by mouth daily, Disp-90 tablet, R-1Normal  3. Essential hypertension  -     valsartan (DIOVAN) 320 MG tablet; Take 1 tablet by mouth daily, Disp-90 tablet, R-1Normal  4. Prostate nodule      Followup:  Return for 6 mo for part 1 and 2.

## 2023-10-18 ENCOUNTER — OFFICE VISIT (OUTPATIENT)
Dept: UROLOGY | Age: 73
End: 2023-10-18
Payer: COMMERCIAL

## 2023-10-18 DIAGNOSIS — C61 PROSTATE CANCER (HCC): Primary | ICD-10-CM

## 2023-10-18 LAB
BILIRUBIN, URINE, POC: NEGATIVE
BLOOD URINE, POC: NORMAL
GLUCOSE URINE, POC: 100
KETONES, URINE, POC: NEGATIVE
LEUKOCYTE ESTERASE, URINE, POC: NEGATIVE
NITRITE, URINE, POC: NEGATIVE
PH, URINE, POC: 5 (ref 4.6–8)
PROTEIN,URINE, POC: NEGATIVE
SPECIFIC GRAVITY, URINE, POC: 1.02 (ref 1–1.03)
URINALYSIS CLARITY, POC: NORMAL
URINALYSIS COLOR, POC: NORMAL
UROBILINOGEN, POC: NORMAL

## 2023-10-18 PROCEDURE — 81003 URINALYSIS AUTO W/O SCOPE: CPT | Performed by: UROLOGY

## 2023-10-18 PROCEDURE — 99213 OFFICE O/P EST LOW 20 MIN: CPT | Performed by: UROLOGY

## 2023-10-18 PROCEDURE — 1123F ACP DISCUSS/DSCN MKR DOCD: CPT | Performed by: UROLOGY

## 2023-10-18 ASSESSMENT — ENCOUNTER SYMPTOMS
BACK PAIN: 0
NAUSEA: 0

## 2023-10-18 NOTE — PROGRESS NOTES
[facility]  [address] [place] [id number]  [address] [place] [address]  : 1950    Chief Complaint   Patient presents with    Follow-up        HPI     Fam Last is a 75 y.o. male   With prostate nodule, low risk prostate cancer on AS. Hamida Saxena Patient is accompanied by his daughter who helps interpret. She tells me that the patient voids a 2-3 times a night. His urine flow and stream have been weak. He has not tried any prostate medication. For several years he has been told that there is a prostate nodule. Due to the ongoing issue PCP referred here for evaluation. PSA blood work has been normal.    PSA 2020 was 0.8  PSA  was 0.9  PSA  is 1.1 in . There is no family history of prostate cancer or prostate issues. RAE in : 1+ enlarged prostate there is nodule felt on the left side. No nontender  He was started on tamsulosin in . His granddaughter is  today. His stream is better but he still gets up 2-4 x/night. On 23: RAE: moderate anal stenosis, prostate not enlarged, prominent 1 cm nodule at left mid gland. MRI pelvis on 2-15-23:   FINDINGS:   Prostate gland size: 4.5 cm transverse x 3.2 cm AP x 3.6 cm craniocaudal.           Peripheral zone: No focal homogeneous hypointense lesion on ADC map to suggest   peripheral zone malignancy. Transition zone:   Lesion 1: There is a 0.8 cm focal homogeneously T2 hypointense lesion (series 3   image 20), located on the anterior right transitional zone in the mid gland at   11 o'clock less than 1 cm from the midline. There is associated focal   hypointensity on the ADC map, and associated hyperintensity on the high b value   diffusion-weighted sequence. Analysis of the dynamic pre- and postgadolinium   kinetics demonstrates findings positive for focal, early enhancement. PI-RADS   classification: 4 (high probability for the presence of clinically significant   cancer).

## (undated) DEVICE — KIT BX PROST 20ML VI PREFIL W 10ML 10% NEUT BUFF FRMLN LEAK

## (undated) DEVICE — STERILE PACKED. BORE DIAMETER 1.6 MM; ANGLE OF INSERTION 19° TO THE LONG AXIS OF THE TRANSDUCER: Brand: SINGLE-USE BIPLANE BIOPSY GUIDE

## (undated) DEVICE — MAX-CORE® DISPOSABLE CORE BIOPSY INSTRUMENT, 18G X 25CM: Brand: MAX-CORE

## (undated) DEVICE — GLOVE SURG SZ 8 CRM LTX FREE POLYISOPRENE POLYMER BEAD ANTI

## (undated) DEVICE — HOLDER PRB URONAV

## (undated) DEVICE — JELLY,LUBE,STERILE,FLIP TOP,TUBE,4-OZ: Brand: MEDLINE

## (undated) DEVICE — COVER PRB W1XL11.8IN ENDOCAVITY CLR E BND NEOGUARD

## (undated) DEVICE — DRAPE TWL SURG 16X26IN BLU ORB04] ALLCARE INC]